# Patient Record
Sex: MALE
[De-identification: names, ages, dates, MRNs, and addresses within clinical notes are randomized per-mention and may not be internally consistent; named-entity substitution may affect disease eponyms.]

---

## 2022-09-27 ENCOUNTER — DASHBOARD ENCOUNTERS (OUTPATIENT)
Age: 47
End: 2022-09-27

## 2022-09-27 ENCOUNTER — LAB OUTSIDE AN ENCOUNTER (OUTPATIENT)
Dept: URBAN - METROPOLITAN AREA CLINIC 92 | Facility: CLINIC | Age: 47
End: 2022-09-27

## 2022-09-27 ENCOUNTER — OFFICE VISIT (OUTPATIENT)
Dept: URBAN - METROPOLITAN AREA CLINIC 92 | Facility: CLINIC | Age: 47
End: 2022-09-27
Payer: COMMERCIAL

## 2022-09-27 VITALS
SYSTOLIC BLOOD PRESSURE: 126 MMHG | HEART RATE: 110 BPM | DIASTOLIC BLOOD PRESSURE: 94 MMHG | WEIGHT: 121 LBS | BODY MASS INDEX: 18.99 KG/M2 | TEMPERATURE: 96.6 F | HEIGHT: 67 IN

## 2022-09-27 DIAGNOSIS — M89.669 OSTEOPATHY AFTER POLIOMYELITIS, UNSPECIFIED LOWER LEG: ICD-10-CM

## 2022-09-27 DIAGNOSIS — B91 SEQUELAE OF POLIOMYELITIS: ICD-10-CM

## 2022-09-27 DIAGNOSIS — K59.00 CONSTIPATION, UNSPECIFIED CONSTIPATION TYPE: ICD-10-CM

## 2022-09-27 DIAGNOSIS — Z12.11 COLON CANCER SCREENING: ICD-10-CM

## 2022-09-27 PROBLEM — 110270004: Status: ACTIVE | Noted: 2022-09-27

## 2022-09-27 PROBLEM — 203291003: Status: ACTIVE | Noted: 2022-09-27

## 2022-09-27 PROBLEM — 14760008: Status: ACTIVE | Noted: 2022-09-27

## 2022-09-27 PROCEDURE — 99203 OFFICE O/P NEW LOW 30 MIN: CPT | Performed by: INTERNAL MEDICINE

## 2022-09-27 RX ORDER — ONDANSETRON HYDROCHLORIDE 4 MG/1
1 TABLET AS NEEDED TABLET, FILM COATED ORAL ONCE A DAY
Qty: 2 | Refills: 0 | OUTPATIENT
Start: 2022-09-27

## 2022-09-27 RX ORDER — POLYETHYLENE GLYCOL-3350, SODIUM CHLORIDE, POTASSIUM CHLORIDE AND SODIUM BICARBONATE 420; 11.2; 5.72; 1.48 G/438.4G; G/438.4G; G/438.4G; G/438.4G
AS DIRECTED POWDER, FOR SOLUTION ORAL AS DIRECTED
Qty: 420 GRAM | Refills: 0 | OUTPATIENT
Start: 2022-09-27 | End: 2022-09-28

## 2022-09-27 NOTE — HPI-TODAY'S VISIT:
46 yo male ref by Dr Jarod Ramey for a GI consultation for colon ca screening and a copy will be sent to the ref provider. Pt is from [place], [place]. Has been here 21 years. Pt is disabled from Polio. Pt lives with is cousin.  Pt has some constipation and goes every other day. Has not tried miralax. NO GERD. No fam hx known of GI issues.

## 2022-09-28 PROBLEM — 305058001: Status: ACTIVE | Noted: 2022-09-28

## 2022-10-25 ENCOUNTER — OFFICE VISIT (OUTPATIENT)
Dept: URBAN - METROPOLITAN AREA SURGERY CENTER 16 | Facility: SURGERY CENTER | Age: 47
End: 2022-10-25